# Patient Record
Sex: FEMALE | Race: WHITE | Employment: UNEMPLOYED | URBAN - METROPOLITAN AREA
[De-identification: names, ages, dates, MRNs, and addresses within clinical notes are randomized per-mention and may not be internally consistent; named-entity substitution may affect disease eponyms.]

---

## 2018-06-11 ENCOUNTER — HOSPITAL ENCOUNTER (OUTPATIENT)
Dept: OTHER | Age: 43
Discharge: HOME | End: 2018-06-11
Attending: INTERNAL MEDICINE | Admitting: INTERNAL MEDICINE

## 2018-06-11 LAB
ALBUMIN SERPL-MCNC: 4.2 GM/DL (ref 3.4–5)
ALP BLD-CCNC: 87 IU/L (ref 40–128)
ALT SERPL-CCNC: 32 U/L (ref 10–40)
ANION GAP SERPL CALCULATED.3IONS-SCNC: 12 MMOL/L (ref 4–16)
AST SERPL-CCNC: 29 IU/L (ref 15–37)
BANDED NEUTROPHILS ABSOLUTE COUNT: 0.3 K/CU MM
BANDED NEUTROPHILS RELATIVE PERCENT: 2 % (ref 5–11)
BILIRUB SERPL-MCNC: 0.3 MG/DL (ref 0–1)
BUN BLDV-MCNC: 10 MG/DL (ref 6–23)
CALCIUM SERPL-MCNC: 9.9 MG/DL (ref 8.3–10.6)
CHLORIDE BLD-SCNC: 99 MMOL/L (ref 99–110)
CO2: 30 MMOL/L (ref 21–32)
CREAT SERPL-MCNC: 0.7 MG/DL (ref 0.6–1.1)
DIFFERENTIAL TYPE: ABNORMAL
ERYTHROCYTE SEDIMENTATION RATE: 39 MM/HR (ref 0–20)
FERRITIN: 94 NG/ML (ref 15–150)
GFR AFRICAN AMERICAN: >60 ML/MIN/1.73M2
GFR NON-AFRICAN AMERICAN: >60 ML/MIN/1.73M2
GLUCOSE BLD-MCNC: 185 MG/DL (ref 70–99)
HCT VFR BLD CALC: 42.4 % (ref 37–47)
HEMOGLOBIN: 13.4 GM/DL (ref 12.5–16)
LACTATE DEHYDROGENASE: 190 IU/L (ref 120–246)
LYMPHOCYTES ABSOLUTE: 3.8 K/CU MM
LYMPHOCYTES RELATIVE PERCENT: 26 % (ref 24–44)
MCH RBC QN AUTO: 27.9 PG (ref 27–31)
MCHC RBC AUTO-ENTMCNC: 31.6 % (ref 32–36)
MCV RBC AUTO: 88.1 FL (ref 78–100)
MONOCYTES ABSOLUTE: 0.3 K/CU MM
MONOCYTES RELATIVE PERCENT: 2 % (ref 0–4)
PDW BLD-RTO: 13.7 % (ref 11.7–14.9)
PLATELET # BLD: 452 K/CU MM (ref 140–440)
PMV BLD AUTO: 10.1 FL (ref 7.5–11.1)
POTASSIUM SERPL-SCNC: 5.1 MMOL/L (ref 3.5–5.1)
RBC # BLD: 4.81 M/CU MM (ref 4.2–5.4)
SEGMENTED NEUTROPHILS ABSOLUTE COUNT: 10.4 K/CU MM
SEGMENTED NEUTROPHILS RELATIVE PERCENT: 70 % (ref 36–66)
SODIUM BLD-SCNC: 141 MMOL/L (ref 135–145)
TOTAL PROTEIN: 6.9 GM/DL (ref 6.4–8.2)
WBC # BLD: 14.8 K/CU MM (ref 4–10.5)

## 2018-06-13 LAB — ANTI-NUCLEAR ANTIBODY (ANA): NORMAL

## 2018-11-13 ENCOUNTER — HOSPITAL ENCOUNTER (OUTPATIENT)
Age: 43
Setting detail: SPECIMEN
Discharge: HOME | End: 2018-11-13

## 2018-11-13 LAB
ALBUMIN SERPL-MCNC: 4.1 GM/DL (ref 3.4–5)
ALP BLD-CCNC: 88 IU/L (ref 40–128)
ALT SERPL-CCNC: 44 U/L (ref 10–40)
ANION GAP SERPL CALCULATED.3IONS-SCNC: 11 MMOL/L (ref 4–16)
AST SERPL-CCNC: 50 IU/L (ref 15–37)
BILIRUB SERPL-MCNC: 0.5 MG/DL (ref 0–1)
BUN BLDV-MCNC: 9 MG/DL (ref 6–23)
CALCIUM SERPL-MCNC: 9.4 MG/DL (ref 8.3–10.6)
CHLORIDE BLD-SCNC: 99 MMOL/L (ref 99–110)
CO2: 28 MMOL/L (ref 21–32)
CREAT SERPL-MCNC: 0.6 MG/DL (ref 0.6–1.1)
GFR AFRICAN AMERICAN: >60 ML/MIN/1.73M2
GFR NON-AFRICAN AMERICAN: >60 ML/MIN/1.73M2
GLUCOSE BLD-MCNC: 183 MG/DL (ref 70–99)
POTASSIUM SERPL-SCNC: 4.8 MMOL/L (ref 3.5–5.1)
SODIUM BLD-SCNC: 138 MMOL/L (ref 135–145)
TOTAL PROTEIN: 6.8 GM/DL (ref 6.4–8.2)

## 2018-11-13 PROCEDURE — 80053 COMPREHEN METABOLIC PANEL: CPT

## 2019-07-15 ENCOUNTER — HOSPITAL ENCOUNTER (OUTPATIENT)
Age: 44
Setting detail: SPECIMEN
Discharge: HOME | End: 2019-07-15
Payer: COMMERCIAL

## 2019-07-15 LAB
ALBUMIN SERPL-MCNC: 4.2 GM/DL (ref 3.4–5)
ALP BLD-CCNC: 101 IU/L (ref 40–129)
ALT SERPL-CCNC: 48 U/L (ref 10–40)
ANION GAP SERPL CALCULATED.3IONS-SCNC: 11 MMOL/L (ref 4–16)
AST SERPL-CCNC: 52 IU/L (ref 15–37)
BILIRUB SERPL-MCNC: 0.3 MG/DL (ref 0–1)
BUN BLDV-MCNC: 8 MG/DL (ref 6–23)
CALCIUM SERPL-MCNC: 9.5 MG/DL (ref 8.3–10.6)
CHLORIDE BLD-SCNC: 98 MMOL/L (ref 99–110)
CO2: 27 MMOL/L (ref 21–32)
CREAT SERPL-MCNC: 0.6 MG/DL (ref 0.6–1.1)
GFR AFRICAN AMERICAN: >60 ML/MIN/1.73M2
GFR NON-AFRICAN AMERICAN: >60 ML/MIN/1.73M2
GLUCOSE BLD-MCNC: 255 MG/DL (ref 70–99)
LACTATE DEHYDROGENASE: 208 IU/L (ref 120–246)
POTASSIUM SERPL-SCNC: 5.2 MMOL/L (ref 3.5–5.1)
SODIUM BLD-SCNC: 136 MMOL/L (ref 135–145)
TOTAL PROTEIN: 6.9 GM/DL (ref 6.4–8.2)

## 2019-07-15 PROCEDURE — 80053 COMPREHEN METABOLIC PANEL: CPT

## 2019-07-15 PROCEDURE — 83615 LACTATE (LD) (LDH) ENZYME: CPT

## 2020-01-13 ENCOUNTER — HOSPITAL ENCOUNTER (OUTPATIENT)
Dept: INFUSION THERAPY | Age: 45
Discharge: HOME | End: 2020-01-13
Payer: COMMERCIAL

## 2020-01-13 LAB
ALBUMIN SERPL-MCNC: 4.4 GM/DL (ref 3.4–5)
ALP BLD-CCNC: 93 IU/L (ref 40–129)
ALT SERPL-CCNC: 35 U/L (ref 10–40)
ANION GAP SERPL CALCULATED.3IONS-SCNC: 12 MMOL/L (ref 4–16)
AST SERPL-CCNC: 37 IU/L (ref 15–37)
BASOPHILS ABSOLUTE: 0.4 K/CU MM
BASOPHILS RELATIVE PERCENT: 3 % (ref 0–1)
BILIRUB SERPL-MCNC: 0.4 MG/DL (ref 0–1)
BUN BLDV-MCNC: 8 MG/DL (ref 6–23)
CALCIUM SERPL-MCNC: 9.5 MG/DL (ref 8.3–10.6)
CHLORIDE BLD-SCNC: 96 MMOL/L (ref 99–110)
CO2: 27 MMOL/L (ref 21–32)
CREAT SERPL-MCNC: 0.7 MG/DL (ref 0.6–1.1)
DIFFERENTIAL TYPE: ABNORMAL
DIFFERENTIAL TYPE: ABNORMAL
EOSINOPHILS ABSOLUTE: 0.1 K/CU MM
EOSINOPHILS RELATIVE PERCENT: 1 % (ref 0–3)
GFR AFRICAN AMERICAN: >60 ML/MIN/1.73M2
GFR NON-AFRICAN AMERICAN: >60 ML/MIN/1.73M2
GLUCOSE BLD-MCNC: 212 MG/DL (ref 70–99)
HCT VFR BLD CALC: 41.3 % (ref 37–47)
HEMOGLOBIN: 13.6 GM/DL (ref 12.5–16)
LACTATE DEHYDROGENASE: 197 IU/L (ref 120–246)
LYMPHOCYTES ABSOLUTE: 3.2 K/CU MM
LYMPHOCYTES RELATIVE PERCENT: 23 % (ref 24–44)
MCH RBC QN AUTO: 27.9 PG (ref 27–31)
MCHC RBC AUTO-ENTMCNC: 32.9 % (ref 32–36)
MCV RBC AUTO: 84.8 FL (ref 78–100)
MONOCYTES ABSOLUTE: 0.6 K/CU MM
MONOCYTES RELATIVE PERCENT: 4 % (ref 0–4)
PDW BLD-RTO: 14 % (ref 11.7–14.9)
PLATELET # BLD: 424 K/CU MM (ref 140–440)
PMV BLD AUTO: 9.9 FL (ref 7.5–11.1)
POTASSIUM SERPL-SCNC: 4.9 MMOL/L (ref 3.5–5.1)
PROMYELOCYTES ABSOLUTE COUNT: 0.14 K/CU MM
PROMYELOCYTES PERCENT: 1 %
RBC # BLD: 4.87 M/CU MM (ref 4.2–5.4)
SEGMENTED NEUTROPHILS ABSOLUTE COUNT: 9.4 K/CU MM
SEGMENTED NEUTROPHILS RELATIVE PERCENT: 68 % (ref 36–66)
SODIUM BLD-SCNC: 135 MMOL/L (ref 135–145)
TOTAL PROTEIN: 7.3 GM/DL (ref 6.4–8.2)
WBC # BLD: 13.8 K/CU MM (ref 4–10.5)

## 2020-01-13 PROCEDURE — 85007 BL SMEAR W/DIFF WBC COUNT: CPT

## 2020-01-13 PROCEDURE — 85027 COMPLETE CBC AUTOMATED: CPT

## 2020-01-13 PROCEDURE — 36415 COLL VENOUS BLD VENIPUNCTURE: CPT

## 2020-01-13 PROCEDURE — 83615 LACTATE (LD) (LDH) ENZYME: CPT

## 2020-01-13 PROCEDURE — 80053 COMPREHEN METABOLIC PANEL: CPT

## 2020-08-14 PROBLEM — D75.839 THROMBOCYTOSIS: Status: ACTIVE | Noted: 2020-08-14

## 2020-08-14 PROBLEM — D72.828 NEUTROPHILIC LEUKOCYTOSIS: Status: ACTIVE | Noted: 2020-08-14

## 2020-08-14 PROBLEM — D72.9 NEUTROPHILIC LEUKOCYTOSIS: Status: ACTIVE | Noted: 2020-08-14

## 2020-09-15 PROBLEM — R53.82 CHRONIC FATIGUE, UNSPECIFIED: Status: ACTIVE | Noted: 2020-09-15

## 2021-12-24 ENCOUNTER — HOSPITAL ENCOUNTER (EMERGENCY)
Age: 46
Discharge: HOME | End: 2021-12-25
Attending: EMERGENCY MEDICINE
Payer: COMMERCIAL

## 2021-12-24 DIAGNOSIS — R05.9 COUGH: Primary | ICD-10-CM

## 2021-12-24 LAB
ALBUMIN SERPL-MCNC: 3.5 GM/DL (ref 3.4–5)
ALP BLD-CCNC: 96 IU/L (ref 40–129)
ALT SERPL-CCNC: 58 U/L (ref 10–40)
ANION GAP SERPL CALCULATED.3IONS-SCNC: 12 MMOL/L (ref 4–16)
AST SERPL-CCNC: 65 IU/L (ref 15–37)
BASOPHILS ABSOLUTE: 0 K/CU MM
BASOPHILS RELATIVE PERCENT: 0.4 % (ref 0–1)
BILIRUB SERPL-MCNC: 0.4 MG/DL (ref 0–1)
BUN BLDV-MCNC: 8 MG/DL (ref 6–23)
CALCIUM SERPL-MCNC: 8.6 MG/DL (ref 8.3–10.6)
CHLORIDE BLD-SCNC: 96 MMOL/L (ref 99–110)
CO2: 25 MMOL/L (ref 21–32)
CREAT SERPL-MCNC: 0.7 MG/DL (ref 0.6–1.1)
DIFFERENTIAL TYPE: ABNORMAL
EOSINOPHILS ABSOLUTE: 0 K/CU MM
EOSINOPHILS RELATIVE PERCENT: 0.2 % (ref 0–3)
GFR AFRICAN AMERICAN: >60 ML/MIN/1.73M2
GFR NON-AFRICAN AMERICAN: >60 ML/MIN/1.73M2
GLUCOSE BLD-MCNC: 253 MG/DL (ref 70–99)
HCT VFR BLD CALC: 44.7 % (ref 37–47)
HEMOGLOBIN: 14.7 GM/DL (ref 12.5–16)
IMMATURE NEUTROPHIL %: 1.2 % (ref 0–0.43)
LYMPHOCYTES ABSOLUTE: 1.6 K/CU MM
LYMPHOCYTES RELATIVE PERCENT: 31.1 % (ref 24–44)
MCH RBC QN AUTO: 27.1 PG (ref 27–31)
MCHC RBC AUTO-ENTMCNC: 32.9 % (ref 32–36)
MCV RBC AUTO: 82.5 FL (ref 78–100)
MONOCYTES ABSOLUTE: 0.4 K/CU MM
MONOCYTES RELATIVE PERCENT: 7 % (ref 0–4)
PDW BLD-RTO: 12.8 % (ref 11.7–14.9)
PLATELET # BLD: 297 K/CU MM (ref 140–440)
PMV BLD AUTO: 9.6 FL (ref 7.5–11.1)
POTASSIUM SERPL-SCNC: 4.3 MMOL/L (ref 3.5–5.1)
RAPID INFLUENZA  B AGN: NEGATIVE
RAPID INFLUENZA A AGN: NEGATIVE
RBC # BLD: 5.42 M/CU MM (ref 4.2–5.4)
SEGMENTED NEUTROPHILS ABSOLUTE COUNT: 3.1 K/CU MM
SEGMENTED NEUTROPHILS RELATIVE PERCENT: 60.1 % (ref 36–66)
SODIUM BLD-SCNC: 133 MMOL/L (ref 135–145)
TOTAL IMMATURE NEUTOROPHIL: 0.06 K/CU MM
TOTAL PROTEIN: 7.1 GM/DL (ref 6.4–8.2)
WBC # BLD: 5.1 K/CU MM (ref 4–10.5)

## 2021-12-24 PROCEDURE — 99283 EMERGENCY DEPT VISIT LOW MDM: CPT

## 2021-12-24 PROCEDURE — 96374 THER/PROPH/DIAG INJ IV PUSH: CPT

## 2021-12-24 PROCEDURE — 87804 INFLUENZA ASSAY W/OPTIC: CPT

## 2021-12-24 PROCEDURE — 2580000003 HC RX 258: Performed by: EMERGENCY MEDICINE

## 2021-12-24 PROCEDURE — U0003 INFECTIOUS AGENT DETECTION BY NUCLEIC ACID (DNA OR RNA); SEVERE ACUTE RESPIRATORY SYNDROME CORONAVIRUS 2 (SARS-COV-2) (CORONAVIRUS DISEASE [COVID-19]), AMPLIFIED PROBE TECHNIQUE, MAKING USE OF HIGH THROUGHPUT TECHNOLOGIES AS DESCRIBED BY CMS-2020-01-R: HCPCS

## 2021-12-24 PROCEDURE — 6370000000 HC RX 637 (ALT 250 FOR IP): Performed by: EMERGENCY MEDICINE

## 2021-12-24 PROCEDURE — 6360000002 HC RX W HCPCS: Performed by: EMERGENCY MEDICINE

## 2021-12-24 PROCEDURE — 80053 COMPREHEN METABOLIC PANEL: CPT

## 2021-12-24 PROCEDURE — U0005 INFEC AGEN DETEC AMPLI PROBE: HCPCS

## 2021-12-24 PROCEDURE — 85025 COMPLETE CBC W/AUTO DIFF WBC: CPT

## 2021-12-24 RX ORDER — LAMOTRIGINE 200 MG/1
200 TABLET ORAL 2 TIMES DAILY
COMMUNITY
Start: 2021-08-25

## 2021-12-24 RX ORDER — ACETAMINOPHEN 500 MG
1000 TABLET ORAL ONCE
Status: COMPLETED | OUTPATIENT
Start: 2021-12-24 | End: 2021-12-24

## 2021-12-24 RX ORDER — GLIPIZIDE 10 MG/1
10 TABLET ORAL 2 TIMES DAILY
COMMUNITY
End: 2022-09-22

## 2021-12-24 RX ORDER — LEVETIRACETAM 1000 MG/1
1000 TABLET ORAL 2 TIMES DAILY
COMMUNITY
Start: 2021-08-25

## 2021-12-24 RX ORDER — KETOROLAC TROMETHAMINE 30 MG/ML
15 INJECTION, SOLUTION INTRAMUSCULAR; INTRAVENOUS ONCE
Status: COMPLETED | OUTPATIENT
Start: 2021-12-24 | End: 2021-12-24

## 2021-12-24 RX ORDER — 0.9 % SODIUM CHLORIDE 0.9 %
1000 INTRAVENOUS SOLUTION INTRAVENOUS ONCE
Status: COMPLETED | OUTPATIENT
Start: 2021-12-24 | End: 2021-12-24

## 2021-12-24 RX ADMIN — ACETAMINOPHEN 1000 MG: 500 TABLET ORAL at 22:49

## 2021-12-24 RX ADMIN — KETOROLAC TROMETHAMINE 15 MG: 30 INJECTION, SOLUTION INTRAMUSCULAR; INTRAVENOUS at 23:37

## 2021-12-24 RX ADMIN — SODIUM CHLORIDE 1000 ML: 9 INJECTION, SOLUTION INTRAVENOUS at 22:49

## 2021-12-24 ASSESSMENT — ENCOUNTER SYMPTOMS
SORE THROAT: 0
RHINORRHEA: 0
SHORTNESS OF BREATH: 0
BACK PAIN: 0
EYE DISCHARGE: 0
COUGH: 1
EYE PAIN: 0
VOMITING: 0
ABDOMINAL PAIN: 0
NAUSEA: 0

## 2021-12-24 ASSESSMENT — PAIN SCALES - GENERAL
PAINLEVEL_OUTOF10: 0
PAINLEVEL_OUTOF10: 0

## 2021-12-25 ENCOUNTER — APPOINTMENT (OUTPATIENT)
Dept: GENERAL RADIOLOGY | Age: 46
End: 2021-12-25
Payer: COMMERCIAL

## 2021-12-25 VITALS
HEART RATE: 82 BPM | OXYGEN SATURATION: 94 % | RESPIRATION RATE: 20 BRPM | TEMPERATURE: 98.4 F | BODY MASS INDEX: 31.65 KG/M2 | HEIGHT: 65 IN | DIASTOLIC BLOOD PRESSURE: 69 MMHG | WEIGHT: 190 LBS | SYSTOLIC BLOOD PRESSURE: 105 MMHG

## 2021-12-25 LAB
SARS-COV-2: DETECTED
SOURCE: ABNORMAL

## 2021-12-25 PROCEDURE — 71045 X-RAY EXAM CHEST 1 VIEW: CPT

## 2021-12-25 RX ORDER — ONDANSETRON 4 MG/1
4 TABLET, ORALLY DISINTEGRATING ORAL 3 TIMES DAILY PRN
Qty: 21 TABLET | Refills: 0 | Status: SHIPPED | OUTPATIENT
Start: 2021-12-25

## 2021-12-25 NOTE — ED NOTES
Discharge instructions and prescription reviewed with pt and verbalizes understanding.      Krystal Carrizales RN  12/25/21 4790

## 2021-12-25 NOTE — ED PROVIDER NOTES
[address] [facility] ENCOUNTER      Pt Name: Chung Garcia  MRN: [id number]  [place] 1975  Date of evaluation: 12/24/2021  Provider: Jerad Matthews MD    CHIEF COMPLAINT       Chief Complaint   Patient presents with    Fatigue    Cough    Fever         HISTORY OF PRESENT ILLNESS      Chung Garcia is a 41 y.o. female who presents to the emergency department  for   Chief Complaint   Patient presents with    Fatigue    Cough    Fever       62-year-old female presents with several days of fatigue and poor oral intake. She developed a cough today. Denies any known sick contacts. She has not been vaccinated for Covid-19. She denies any vomiting or diarrhea. Denies any rectal abdominal pain. She has a family members who have tested positive for Covid-19 a few weeks ago. She denies any difficulty breathing. She denies any significant sputum production. She does have an elevated temperature upon presentation to the emergency department. She does not identify any chest pain alleviating factors. GCS of 15 emergency department. She is moving all extremity spontaneously. No signs of respiratory distress. Nursing Notes, Triage Notes & Vital Signs were reviewed. REVIEW OF SYSTEMS    (2-9 systems for level 4, 10 or more for level 5)     Review of Systems   Constitutional: Positive for fatigue and fever. HENT: Negative for congestion, rhinorrhea and sore throat. Eyes: Negative for pain and discharge. Respiratory: Positive for cough. Negative for shortness of breath. Gastrointestinal: Negative for abdominal pain, nausea and vomiting. Endocrine: Negative for polydipsia and polyuria. Genitourinary: Negative for dysuria and flank pain. Musculoskeletal: Negative for back pain and neck pain. Skin: Negative for pallor and wound. Neurological: Negative for dizziness, facial asymmetry, light-headedness, numbness and headaches. Psychiatric/Behavioral: Negative for confusion. Except as noted above the remainder of the review of systems was reviewed and negative. PAST MEDICAL HISTORY     Past Medical History:   Diagnosis Date    Diabetes mellitus ([facility]     Epilepsy ([facility]        Prior to Admission medications    Medication Sig Start Date End Date Taking? Authorizing Provider   ondansetron (ZOFRAN-ODT) 4 MG disintegrating tablet Take 1 tablet by mouth 3 times daily as needed for Nausea or Vomiting 21  Yes Arlyn Mae MD   lamoTRIgine (LAMICTAL) 200 MG tablet Take 200 mg by mouth 2 times daily 21  Yes Historical Provider, MD   levETIRAcetam (KEPPRA) 1000 MG tablet Take 1,000 mg by mouth 2 times daily 21  Yes Historical Provider, MD   metFORMIN (GLUCOPHAGE) 500 MG tablet Take 1,000 mg by mouth 2 times daily (with meals)   Yes Historical Provider, MD   glipiZIDE (GLUCOTROL) 10 MG tablet Take 10 mg by mouth 2 times daily   Yes Historical Provider, MD        Patient Active Problem List   Diagnosis    Neutrophilic leukocytosis    Thrombocytosis    Chronic fatigue, unspecified         SURGICAL HISTORY       Past Surgical History:   Procedure Laterality Date     SECTION      x3    CHOLECYSTECTOMY           CURRENT MEDICATIONS       Previous Medications    GLIPIZIDE (GLUCOTROL) 10 MG TABLET    Take 10 mg by mouth 2 times daily    LAMOTRIGINE (LAMICTAL) 200 MG TABLET    Take 200 mg by mouth 2 times daily    LEVETIRACETAM (KEPPRA) 1000 MG TABLET    Take 1,000 mg by mouth 2 times daily    METFORMIN (GLUCOPHAGE) 500 MG TABLET    Take 1,000 mg by mouth 2 times daily (with meals)       ALLERGIES     Patient has no known allergies. FAMILY HISTORY     History reviewed. No pertinent family history.        SOCIAL HISTORY       Social History     Socioeconomic History    Marital status:      Spouse name: None    Number of children: None    Years of education: None    Highest education level: None Occupational History    None   Tobacco Use    Smoking status: Never Smoker    Smokeless tobacco: Never Used   Substance and Sexual Activity    Alcohol use: Never    Drug use: Never    Sexual activity: None   Other Topics Concern    None   Social History Narrative    None     Social Determinants of Health     Financial Resource Strain:     Difficulty of Paying Living Expenses: Not on file   Food Insecurity:     Worried About Running Out of Food in the Last Year: Not on file    Joey of Food in the Last Year: Not on file   Transportation Needs:     Lack of Transportation (Medical): Not on file    Lack of Transportation (Non-Medical): Not on file   Physical Activity:     Days of Exercise per Week: Not on file    Minutes of Exercise per Session: Not on file   Stress:     Feeling of Stress : Not on file   Social Connections:     Frequency of Communication with Friends and Family: Not on file    Frequency of Social Gatherings with Friends and Family: Not on file    Attends Spiritism Services: Not on file    Active Member of [address] [place] [address] or Organizations: Not on file    Attends Club or Organization Meetings: Not on file    Marital Status: Not on file   Intimate Partner Violence:     Fear of Current or Ex-Partner: Not on file    Emotionally Abused: Not on file    Physically Abused: Not on file    Sexually Abused: Not on file   Housing Stability:     Unable to Pay for Housing in the Last Year: Not on file    Number of [place] in the Last Year: Not on file    Unstable Housing in the Last Year: Not on file       SCREENINGS               PHYSICAL EXAM    (up to 7 for level 4, 8 or more for level 5)     ED Triage Vitals [12/24/21 2146]   BP Temp Temp Source Pulse Resp SpO2 Height Weight   130/82 100.2 °F (37.9 °C) Oral 97 16 96 % 5' 5\" (1.651 m) 190 lb (86.2 kg)       Physical Exam  Vitals reviewed. Constitutional:       Appearance: She is not ill-appearing or toxic-appearing.    HENT:      Head: Normocephalic and atraumatic. Nose: No congestion or rhinorrhea. Mouth/Throat:      Mouth: Mucous membranes are moist.      Pharynx: No oropharyngeal exudate or posterior oropharyngeal erythema. Eyes:      General:         Right eye: No discharge. Left eye: No discharge. Extraocular Movements: Extraocular movements intact. Pupils: Pupils are equal, round, and reactive to light. Cardiovascular:      Rate and Rhythm: Normal rate. Heart sounds: No friction rub. No gallop. Pulmonary:      Effort: Pulmonary effort is normal. No respiratory distress. Chest:      Chest wall: No tenderness. Abdominal:      Palpations: Abdomen is soft. Tenderness: There is no abdominal tenderness. There is no guarding. Musculoskeletal:         General: No swelling or tenderness. Normal range of motion. Cervical back: Normal range of motion and neck supple. No tenderness. Lymphadenopathy:      Cervical: No cervical adenopathy. Skin:     General: Skin is warm. Capillary Refill: Capillary refill takes less than 2 seconds. Neurological:      General: No focal deficit present. Mental Status: She is alert and oriented to person, place, and time.          DIAGNOSTIC RESULTS     Labs Reviewed   COMPREHENSIVE METABOLIC PANEL W/ REFLEX TO MG FOR LOW K - Abnormal; Notable for the following components:       Result Value    Sodium 133 (*)     Chloride 96 (*)     Glucose 253 (*)     ALT 58 (*)     AST 65 (*)     All other components within normal limits   CBC WITH AUTO DIFFERENTIAL - Abnormal; Notable for the following components:    RBC 5.42 (*)     Monocytes % 7.0 (*)     Immature Neutrophil % 1.2 (*)     All other components within normal limits   RAPID INFLUENZA A/B ANTIGENS   COVID-19          RADIOLOGY:     Non-plain film images such as CT, Ultrasound and MRI are read by the radiologist. Plain radiographic images are visualized and preliminarily interpreted by the emergency physician. Interpretation per the Radiologist below, if available at the time of this note:    XR CHEST PORTABLE   Final Result   No acute cardiopulmonary process. ED BEDSIDE ULTRASOUND:   Performed by ED Physician Marquez Reese MD       LABS:  Labs Reviewed   COMPREHENSIVE METABOLIC PANEL W/ REFLEX TO MG FOR LOW K - Abnormal; Notable for the following components:       Result Value    Sodium 133 (*)     Chloride 96 (*)     Glucose 253 (*)     ALT 58 (*)     AST 65 (*)     All other components within normal limits   CBC WITH AUTO DIFFERENTIAL - Abnormal; Notable for the following components:    RBC 5.42 (*)     Monocytes % 7.0 (*)     Immature Neutrophil % 1.2 (*)     All other components within normal limits   RAPID INFLUENZA A/B ANTIGENS   COVID-19       All other labs were within normal range or not returned as of this dictation. EMERGENCY DEPARTMENT COURSE and DIFFERENTIAL DIAGNOSIS/MDM:   Vitals:    Vitals:    12/24/21 2146 12/25/21 0027   BP: 130/82 105/69   Pulse: 97 82   Resp: 16 20   Temp: 100.2 °F (37.9 °C) 98.4 °F (36.9 °C)   TempSrc: Oral Oral   SpO2: 96% 94%   Weight: 190 lb (86.2 kg)    Height: 5' 5\" (1.651 m)            MDM  Number of Diagnoses or Management Options  Diagnosis management comments: 61-year-old female presents with generalized fatigue and poor oral intake of the last 2 days. She presented to the emergency department with a temperature of 100.2. Vitals otherwise unremarkable. She denies any known sick contacts. She has not been vaccinated for Covid-19. She does endorse the development of a cough over the last day. No signs of respiratory distress. Labs are obtained and overall nonacute. She is treated with fluids, Toradol and Tylenol in the emergency department. Chest x-ray is obtained and is nonacute. Rapid influenza test is obtained and is negative. COVID-19 test is ordered and is pending at this time.   To follow results of her Afshin-19 test outpatient. With the constellation of her elevated temperatures, cough and generalized fatigue I do feel her symptoms are consistent with a viral infection. She will undertake symptomatic treatment at home. She will follow-up outpatient as needed. Strict return precautions for worsening or concerning symptoms are discussed. She is discharged in stable condition, ambulatory with return precautions. Amount and/or Complexity of Data Reviewed  Decide to obtain previous medical records or to obtain history from someone other than the patient: yes        -  Patient seen and evaluated in the emergency department. -  Triage and nursing notes reviewed and incorporated. -  Old chart records reviewed and incorporated. -  Work-up included:  See above  -  Results discussed with patient. CONSULTS:  None    PROCEDURES:  None performed unless otherwise noted below     Procedures        FINAL IMPRESSION      1. Cough          DISPOSITION/PLAN   DISPOSITION        PATIENT REFERRED TO:  [address]  [place] [address]  [phone]    Schedule an appointment as soon as possible for a visit in 1 week        DISCHARGE MEDICATIONS:  New Prescriptions    ONDANSETRON (ZOFRAN-ODT) 4 MG DISINTEGRATING TABLET    Take 1 tablet by mouth 3 times daily as needed for Nausea or Vomiting       ED Provider Disposition Time  DISPOSITION        Appropriate personal protective equipment was worn during the patient's evaluation. These included surgical, eye protection, surgical mask or in 95 respirator and gloves. The patient was also placed in a surgical mask for the prevention of possible spread of respiratory viral illnesses. The Patient was instructed to read the package inserts with any medication that was prescribed. Major potential reactions and medication interactions were discussed. The Patient understands that there are numerous possible adverse reactions not covered.     The patient was also instructed to arrange follow-up with his or her primary care provider for review of any pending labwork or incidental findings on any radiology results that were obtained. All efforts were made to discuss any incidental findings that require further monitoring. Controlled Substances Monitoring:     No flowsheet data found.     (Please note that portions of this note were completed with a voice recognition program.  Efforts were made to edit the dictations but occasionally words are mis-transcribed.)    Ghanshyam Morel MD (electronically signed)  Attending Emergency Physician           Ghanshyam Morel MD  12/25/21 0040

## 2022-07-11 ENCOUNTER — HOSPITAL ENCOUNTER (OUTPATIENT)
Age: 47
Discharge: HOME | End: 2022-07-11
Payer: COMMERCIAL

## 2022-07-11 LAB
ALBUMIN SERPL-MCNC: 3.9 GM/DL (ref 3.4–5)
ALP BLD-CCNC: 101 IU/L (ref 40–128)
ALT SERPL-CCNC: 32 U/L (ref 10–40)
ANION GAP SERPL CALCULATED.3IONS-SCNC: 10 MMOL/L (ref 4–16)
AST SERPL-CCNC: 33 IU/L (ref 15–37)
BASOPHILS ABSOLUTE: 0.1 K/CU MM
BASOPHILS RELATIVE PERCENT: 0.8 % (ref 0–1)
BILIRUB SERPL-MCNC: 0.4 MG/DL (ref 0–1)
BUN BLDV-MCNC: 10 MG/DL (ref 6–23)
CALCIUM SERPL-MCNC: 9.5 MG/DL (ref 8.3–10.6)
CHLORIDE BLD-SCNC: 99 MMOL/L (ref 99–110)
CO2: 27 MMOL/L (ref 21–32)
CREAT SERPL-MCNC: 0.8 MG/DL (ref 0.6–1.1)
DIFFERENTIAL TYPE: ABNORMAL
EOSINOPHILS ABSOLUTE: 0.4 K/CU MM
EOSINOPHILS RELATIVE PERCENT: 3.1 % (ref 0–3)
GFR AFRICAN AMERICAN: >60 ML/MIN/1.73M2
GFR NON-AFRICAN AMERICAN: >60 ML/MIN/1.73M2
GLUCOSE BLD-MCNC: 429 MG/DL (ref 70–99)
HCT VFR BLD CALC: 39.6 % (ref 37–47)
HEMOGLOBIN: 12.7 GM/DL (ref 12.5–16)
IMMATURE NEUTROPHIL %: 1.9 % (ref 0–0.43)
LYMPHOCYTES ABSOLUTE: 2.9 K/CU MM
LYMPHOCYTES RELATIVE PERCENT: 20.9 % (ref 24–44)
MCH RBC QN AUTO: 29 PG (ref 27–31)
MCHC RBC AUTO-ENTMCNC: 32.1 % (ref 32–36)
MCV RBC AUTO: 90.4 FL (ref 78–100)
MONOCYTES ABSOLUTE: 0.5 K/CU MM
MONOCYTES RELATIVE PERCENT: 3.8 % (ref 0–4)
NUCLEATED RBC %: 0 %
PDW BLD-RTO: 13.2 % (ref 11.7–14.9)
PLATELET # BLD: 406 K/CU MM (ref 140–440)
PMV BLD AUTO: 10.4 FL (ref 7.5–11.1)
POTASSIUM SERPL-SCNC: 4.7 MMOL/L (ref 3.5–5.1)
RBC # BLD: 4.38 M/CU MM (ref 4.2–5.4)
SEGMENTED NEUTROPHILS ABSOLUTE COUNT: 9.8 K/CU MM
SEGMENTED NEUTROPHILS RELATIVE PERCENT: 69.5 % (ref 36–66)
SODIUM BLD-SCNC: 136 MMOL/L (ref 135–145)
TOTAL IMMATURE NEUTOROPHIL: 0.27 K/CU MM
TOTAL NUCLEATED RBC: 0 K/CU MM
TOTAL PROTEIN: 6.8 GM/DL (ref 6.4–8.2)
WBC # BLD: 14.1 K/CU MM (ref 4–10.5)

## 2022-07-11 PROCEDURE — 80053 COMPREHEN METABOLIC PANEL: CPT

## 2022-07-11 PROCEDURE — 36415 COLL VENOUS BLD VENIPUNCTURE: CPT

## 2022-07-11 PROCEDURE — 85025 COMPLETE CBC W/AUTO DIFF WBC: CPT

## 2022-09-22 ENCOUNTER — OFFICE VISIT (OUTPATIENT)
Dept: ONCOLOGY | Age: 47
End: 2022-09-22
Payer: COMMERCIAL

## 2022-09-22 ENCOUNTER — HOSPITAL ENCOUNTER (OUTPATIENT)
Dept: INFUSION THERAPY | Age: 47
Discharge: HOME | End: 2022-09-22
Payer: COMMERCIAL

## 2022-09-22 VITALS
BODY MASS INDEX: 32.36 KG/M2 | HEIGHT: 65 IN | TEMPERATURE: 98 F | OXYGEN SATURATION: 97 % | DIASTOLIC BLOOD PRESSURE: 79 MMHG | HEART RATE: 88 BPM | WEIGHT: 194.2 LBS | SYSTOLIC BLOOD PRESSURE: 138 MMHG

## 2022-09-22 DIAGNOSIS — D75.839 THROMBOCYTOSIS: ICD-10-CM

## 2022-09-22 DIAGNOSIS — D72.9 NEUTROPHILIC LEUKOCYTOSIS: ICD-10-CM

## 2022-09-22 DIAGNOSIS — D72.9 NEUTROPHILIC LEUKOCYTOSIS: Primary | ICD-10-CM

## 2022-09-22 LAB
ALBUMIN SERPL-MCNC: 4.2 GM/DL (ref 3.4–5)
ALP BLD-CCNC: 94 IU/L (ref 40–129)
ALT SERPL-CCNC: 30 U/L (ref 10–40)
ANION GAP SERPL CALCULATED.3IONS-SCNC: 10 MMOL/L (ref 4–16)
AST SERPL-CCNC: 40 IU/L (ref 15–37)
BASOPHILS ABSOLUTE: 0.1 K/CU MM
BASOPHILS RELATIVE PERCENT: 0.3 % (ref 0–1)
BILIRUB SERPL-MCNC: 0.4 MG/DL (ref 0–1)
BUN BLDV-MCNC: 12 MG/DL (ref 6–23)
CALCIUM SERPL-MCNC: 9.4 MG/DL (ref 8.3–10.6)
CHLORIDE BLD-SCNC: 101 MMOL/L (ref 99–110)
CO2: 25 MMOL/L (ref 21–32)
CREAT SERPL-MCNC: 0.8 MG/DL (ref 0.6–1.1)
DIFFERENTIAL TYPE: ABNORMAL
EOSINOPHILS ABSOLUTE: 0.4 K/CU MM
EOSINOPHILS RELATIVE PERCENT: 2.7 % (ref 0–3)
ERYTHROCYTE SEDIMENTATION RATE: 14 MM/HR (ref 0–20)
FERRITIN: 154 NG/ML (ref 15–150)
GFR AFRICAN AMERICAN: >60 ML/MIN/1.73M2
GFR NON-AFRICAN AMERICAN: >60 ML/MIN/1.73M2
GLUCOSE BLD-MCNC: 189 MG/DL (ref 70–99)
HCT VFR BLD CALC: 39.8 % (ref 37–47)
HEMOGLOBIN: 12.9 GM/DL (ref 12.5–16)
LACTATE DEHYDROGENASE: 203 IU/L (ref 120–246)
LYMPHOCYTES ABSOLUTE: 3.2 K/CU MM
LYMPHOCYTES RELATIVE PERCENT: 20.6 % (ref 24–44)
MCH RBC QN AUTO: 28.5 PG (ref 27–31)
MCHC RBC AUTO-ENTMCNC: 32.4 % (ref 32–36)
MCV RBC AUTO: 87.9 FL (ref 78–100)
MONOCYTES ABSOLUTE: 0.8 K/CU MM
MONOCYTES RELATIVE PERCENT: 4.9 % (ref 0–4)
PDW BLD-RTO: 14.1 % (ref 11.7–14.9)
PLATELET # BLD: 413 K/CU MM (ref 140–440)
PMV BLD AUTO: 9.9 FL (ref 7.5–11.1)
POTASSIUM SERPL-SCNC: 4.6 MMOL/L (ref 3.5–5.1)
RBC # BLD: 4.53 M/CU MM (ref 4.2–5.4)
SEGMENTED NEUTROPHILS ABSOLUTE COUNT: 11 K/CU MM
SEGMENTED NEUTROPHILS RELATIVE PERCENT: 71.5 % (ref 36–66)
SODIUM BLD-SCNC: 136 MMOL/L (ref 135–145)
TOTAL PROTEIN: 6.9 GM/DL (ref 6.4–8.2)
WBC # BLD: 15.4 K/CU MM (ref 4–10.5)

## 2022-09-22 PROCEDURE — 85652 RBC SED RATE AUTOMATED: CPT

## 2022-09-22 PROCEDURE — 83615 LACTATE (LD) (LDH) ENZYME: CPT

## 2022-09-22 PROCEDURE — 99211 OFF/OP EST MAY X REQ PHY/QHP: CPT

## 2022-09-22 PROCEDURE — 80053 COMPREHEN METABOLIC PANEL: CPT

## 2022-09-22 PROCEDURE — 82728 ASSAY OF FERRITIN: CPT

## 2022-09-22 PROCEDURE — G8427 DOCREV CUR MEDS BY ELIG CLIN: HCPCS | Performed by: INTERNAL MEDICINE

## 2022-09-22 PROCEDURE — G8417 CALC BMI ABV UP PARAM F/U: HCPCS | Performed by: INTERNAL MEDICINE

## 2022-09-22 PROCEDURE — 1036F TOBACCO NON-USER: CPT | Performed by: INTERNAL MEDICINE

## 2022-09-22 PROCEDURE — 85025 COMPLETE CBC W/AUTO DIFF WBC: CPT

## 2022-09-22 PROCEDURE — 36415 COLL VENOUS BLD VENIPUNCTURE: CPT

## 2022-09-22 PROCEDURE — 99213 OFFICE O/P EST LOW 20 MIN: CPT | Performed by: INTERNAL MEDICINE

## 2022-09-22 RX ORDER — METFORMIN HYDROCHLORIDE 500 MG/1
TABLET, EXTENDED RELEASE ORAL
COMMUNITY
Start: 2022-09-01

## 2022-09-22 RX ORDER — LINAGLIPTIN 5 MG/1
TABLET, FILM COATED ORAL
COMMUNITY
Start: 2022-09-01

## 2022-09-22 RX ORDER — DULAGLUTIDE 0.75 MG/.5ML
INJECTION, SOLUTION SUBCUTANEOUS
COMMUNITY
Start: 2022-08-26

## 2022-09-22 RX ORDER — GLYBURIDE 5 MG/1
TABLET ORAL
COMMUNITY
Start: 2022-08-26

## 2022-09-22 ASSESSMENT — PATIENT HEALTH QUESTIONNAIRE - PHQ9
SUM OF ALL RESPONSES TO PHQ QUESTIONS 1-9: 0
SUM OF ALL RESPONSES TO PHQ QUESTIONS 1-9: 0
SUM OF ALL RESPONSES TO PHQ9 QUESTIONS 1 & 2: 0
1. LITTLE INTEREST OR PLEASURE IN DOING THINGS: 0
2. FEELING DOWN, DEPRESSED OR HOPELESS: 0
SUM OF ALL RESPONSES TO PHQ QUESTIONS 1-9: 0
SUM OF ALL RESPONSES TO PHQ QUESTIONS 1-9: 0

## 2022-09-22 NOTE — PROGRESS NOTES
Patient Name: Shahid Pineda  Patient : 1975  Patient MRN: [id number]     Primary Oncologist: Iman Crane MD  Referring Provider: PIYUSH Bernal CNP     Date of Service: 2022      Chief Complaint:   Chief Complaint   Patient presents with    Follow-up    Results     Patient Active Problem List:     Neutrophilic leukocytosis     Thrombocytosis     Chronic fatigue, unspecified    HPI:   Ms. Manpreet Barbour is a 50-year-old very pleasant patient with medical history significant for diabetes mellitus, diagnosed around , seizure disorder, anxiety and depression, initially referred to me on 2018 for evaluation of mild leukocytosis and thrombocytosis. She states that she was found to have leukocytosis by her neurologist on 2018. Repeat test by her primary care physician on May 21, 2018 showed persistent leukocytosis. She was also found to have mild thrombocytosis on blood test done on May 21, 2018. Because of her leukocytosis and thrombocytosis, she was subsequently referred to me for further evaluation. She denies frequent infection or recent infection. She never smokes cigarettes. She denies fever, chills, night sweats, loss of appetite, weight loss and any lymphadenopathy in her body. Laboratory workups on 2018 showed persistent neutrophil leukocytosis and thrombocytosis. She was also found to have my elevation in ESR [ESR 39]. The rest of the blood tests including complete comprehensive metabolic panel, iron study, LDH, QING, JAK2 and BCR/ABL 1 study where within normal range. She missed follow up with me since 20, until 22. On 2022, she was referred back to me for evaluation of her persistent leukocytosis. I have been following her for neutrophil leukocytosis and thrombocytosis. She is a non-smoker and she has normal iron study. JAK2 and BCR/ABL 1 study on 18 were within normal range.  She has seizure disorder and she is on keppra and lamictal.     I recognize that she has leukocytosis on 22 blood test. I will request all the work ups for MPN today. If all the work ups are normal, I will continue with observation. She does not have any complaints on today's visit. Past Medical History  Significant for  1. Diabetes mellitus  2. Anxiety/depression  3. Seizure disorder    Surgical History  Procedure: Cholecystocecostomy    section   Tonsillectomy   Dilatation and curettage: routine     Allergies  No known medication allergies    Social History  Smoking Status Never smoker  She denies alcohol drinking and illicit drug abuse. She is currently living in [place]. Family History  Father: Liver disease  Mother: Breast cancer, Diabetes, Hypertension    Oncology History    No history exists. Review of Systems: \"Per interval history; otherwise 10 point ROS is negative. \"  Her energy level is stable and her sleep is good. She denies fever, chills, night sweats, cough, shortness of breath, chest pain, hemoptysis, or palpitations. Her bowel and bladder functions are normal. Denies nausea, vomiting, abdominal pain, diarrhea, constipation, dysuria, loss of appetite, or weight loss. No neuropathy and does not have bleeding or clotting issues. She doesn't have any pain on today visit. No anxiety or depression. The rest of the systems are unremarkable.      Vital Signs:  /79 (Site: Right Upper Arm, Position: Sitting, Cuff Size: Medium Adult)   Pulse 88   Temp 98 °F (36.7 °C) (Temporal)   Ht 5' 5\" (1.651 m)   Wt 194 lb 3.2 oz (88.1 kg)   SpO2 97%   BMI 32.32 kg/m²     Physical Exam:  CONSTITUTIONAL: awake, alert, cooperative, no apparent distress   EYES: pupils equal, round and reactive to light, sclera clear, normal conjunctiva  ENT: Normocephalic, without obvious abnormality, atraumatic  NECK: supple, symmetrical, no jugular venous distension, no carotid bruits   HEMATOLOGIC/LYMPHATIC: no cervical, supraclavicular or axillary lymphadenopathy   LUNGS: VBS, no wheezes, no increased work of breathing, no rhonchi, clear to auscultation, no crackles,   CARDIOVASCULAR: regular rate and rhythm, normal S1 and S2, no murmur noted  ABDOMEN: normal bowel sounds x 4, soft, non-distended, non-tender, no masses palpated, no hepatosplenomegaly   MUSCULOSKELETAL: full range of motion noted, tone is normal  NEUROLOGIC: awake, alert, oriented to name, place and time. Motor skills grossly intact. SKIN: appears intact, normal skin color, normal texture, normal turgor, no jaundice.    EXTREMITIES: no LE edema, no clubbing, no leg swelling, no cyanosis,       Labs:  Hematology:  Lab Results   Component Value Date    WBC 15.4 (H) 09/22/2022    RBC 4.53 09/22/2022    HGB 12.9 09/22/2022    HCT 39.8 09/22/2022    MCV 87.9 09/22/2022    MCH 28.5 09/22/2022    MCHC 32.4 09/22/2022    RDW 14.1 09/22/2022     09/22/2022    MPV 9.9 09/22/2022    BANDSPCT 2 (L) 06/11/2018    SEGSPCT 71.5 (H) 09/22/2022    EOSRELPCT 2.7 09/22/2022    BASOPCT 0.3 09/22/2022    LYMPHOPCT 20.6 (L) 09/22/2022    MONOPCT 4.9 (H) 09/22/2022    BANDABS 0.30 06/11/2018    SEGSABS 11.0 09/22/2022    EOSABS 0.4 09/22/2022    BASOSABS 0.1 09/22/2022    LYMPHSABS 3.2 09/22/2022    MONOSABS 0.8 09/22/2022    DIFFTYPE AUTOMATED DIFFERENTIAL 09/22/2022     Lab Results   Component Value Date    ESR 14 09/22/2022     Chemistry:  Lab Results   Component Value Date     07/11/2022    K 4.7 07/11/2022    CL 99 07/11/2022    CO2 27 07/11/2022    BUN 10 07/11/2022    CREATININE 0.8 07/11/2022    GLUCOSE 429 (HH) 07/11/2022    CALCIUM 9.5 07/11/2022    PROT 6.8 07/11/2022    LABALBU 3.9 07/11/2022    BILITOT 0.4 07/11/2022    ALKPHOS 101 07/11/2022    AST 33 07/11/2022    ALT 32 07/11/2022    LABGLOM >60 07/11/2022    GFRAA >60 07/11/2022     Lab Results   Component Value Date     01/13/2020     No components found for: LD  No results found for: TSHHS, T4FREE, FT3  Immunology:  Lab Results   Component Value Date    PROT 6.8 07/11/2022     No results found for: Chelita Ba, KLFLCR  No results found for: B2M  Coagulation Panel:  No results found for: PROTIME, INR, APTT, DDIMER  Anemia Panel:  No results found for: AIWAEOZQ83, FOLATE  Tumor Markers:  No results found for: , CEA, , LABCA2, PSA  Observations:  PHQ-9 Total Score: 0 (9/22/2022  3:27 PM)      Assessment:  1. Leukocytosis  2. Thrombocytosis    Plan:   Ms. hill has been followed before for leukocytosis and thrombocytosis. She missed follow up with me since 1/13/20, until 9/22/22. On September 22, 2022, she was referred back to me for evaluation of her persistent leukocytosis. I have been following her for neutrophil leukocytosis and thrombocytosis. She is a non-smoker and she has normal iron study. JAK2 and BCR/ABL 1 study on 6/1/18 were within normal range. She has seizure disorder and she is on keppra and lamictal.     I recognize that she has leukocytosis on 7/11/22 blood test. I will request all the work ups for MPN today. If all the work ups are normal, I will continue with observation. I answered all their questions and concerns for today. Recent imaging and labs were reviewed and discussed with the patient.

## 2022-09-22 NOTE — PROGRESS NOTES
MA Rooming Questions  Patient: Irma Boyer  MRN: [id number]    Date: 9/22/2022        1. Do you have any new issues? yes - elevated wbc          2. Do you need any refills on medications?    no    3. Have you had any imaging done since your last visit? yes - labs at \[facility]"" and [facility]    4. Have you been hospitalized or seen in the emergency room since your last visit here?   no    5. Did the patient have a depression screening completed today?  Yes    PHQ-9 Total Score: 0 (9/22/2022  3:27 PM)       PHQ-9 Given to (if applicable):               PHQ-9 Score (if applicable):                     [] Positive     []  Negative              Does question #9 need addressed (if applicable)                     [] Yes    []  No               Judith Navas CMA

## 2022-10-11 NOTE — PROGRESS NOTES
Patient Name: Anat Frias  Patient : 1975  Patient MRN: [id number]     Primary Oncologist: Curry Daniel MD  Referring Provider: PIYUSH Suarez NP     Date of Service: 10/17/2022      Chief Complaint:   Chief Complaint   Patient presents with    Follow-up       Patient Active Problem List:     Neutrophilic leukocytosis     Thrombocytosis     Chronic fatigue, unspecified    HPI:   Ms. Estevan Moralez is a 81-year-old very pleasant patient with medical history significant for diabetes mellitus, diagnosed around , seizure disorder, anxiety and depression, initially referred to me on 2018 for evaluation of mild leukocytosis and thrombocytosis. She states that she was found to have leukocytosis by her neurologist on 2018. Repeat test by her primary care physician on May 21, 2018 showed persistent leukocytosis. She was also found to have mild thrombocytosis on blood test done on May 21, 2018. Because of her leukocytosis and thrombocytosis, she was subsequently referred to me for further evaluation. She denies frequent infection or recent infection. She never smokes cigarettes. She denies fever, chills, night sweats, loss of appetite, weight loss and any lymphadenopathy in her body. Laboratory workups on 2018 showed persistent neutrophil leukocytosis and thrombocytosis. She was also found to have my elevation in ESR [ESR 39]. The rest of the blood tests including complete comprehensive metabolic panel, iron study, LDH, QING, JAK2 and BCR/ABL 1 study where within normal range. She missed follow up with me since 20, until 22. Repeat laboratory work ups on 22 showed mild leukocytosis (WBC 15.4). The rests of the blood tests, including ferritin, ESR, BCR-ABL1, JAK2 V617F, JAK2 exon 12-13, MPL, CALR and flow cytometry, were within normal range. On 2022, she presented to me for follow up.      She was referred back to me on 22 for evaluation of her persistent leukocytosis. I have been following her for neutrophil leukocytosis and thrombocytosis. She is a non-smoker and she has normal iron study. JAK2 and BCR/ABL 1 study on 18 were within normal range. She has seizure disorder and she is on keppra and lamictal.     Since she has persistent leukocytosis, I requested MPN panels. It was done on 22 and all the work ups were within normal range. Since all the work ups for MPN are normal and she has stable counts, I recommend for observation. I will see her back in 1 year. She does not have any complaints on today's visit. Past Medical History  Significant for  1. Diabetes mellitus  2. Anxiety/depression  3. Seizure disorder    Surgical History  Procedure: Cholecystocecostomy    section   Tonsillectomy   Dilatation and curettage: routine     Allergies  No known medication allergies    Social History  Smoking Status Never smoker  She denies alcohol drinking and illicit drug abuse. She is currently living in [facility]. Family History  Father: Liver disease  Mother: Breast cancer, Diabetes, Hypertension    Oncology History    No history exists. Review of Systems: \"Per interval history; otherwise 10 point ROS is negative. \"  Her energy level is good and her sleep is fine. She denies fever, chills, night sweats, cough, shortness of breath, chest pain, hemoptysis, or palpitations. Her bowel and bladder functions are normal. Denies nausea, vomiting, abdominal pain, diarrhea, constipation, dysuria, loss of appetite, or weight loss. No neuropathy and does not have bleeding or clotting issues. She denies any pain on today visit. No anxiety or depression. The rest of the systems are unremarkable.      Vital Signs:  /67 (Site: Right Upper Arm, Position: Sitting, Cuff Size: Medium Adult)   Pulse 89   Temp 97.8 °F (36.6 °C) (Infrared)   Ht 5' 5\" (1.651 m)   Wt 195 lb 9.6 oz (88.7 kg)   SpO2 98%   BMI 32.55 kg/m²     Physical Exam:  CONSTITUTIONAL: awake, alert, cooperative, no apparent distress   EYES: pupils equal, round and reactive to light, sclera clear, normal conjunctiva  ENT: Normocephalic, without obvious abnormality, atraumatic  NECK: supple, symmetrical, no jugular venous distension, no carotid bruits   HEMATOLOGIC/LYMPHATIC: no cervical, supraclavicular or axillary lymphadenopathy   LUNGS: VBS, no wheezes, no increased work of breathing, no rhonchi, clear to auscultation, no crackles,   CARDIOVASCULAR: regular rate and rhythm, normal S1 and S2, no murmur noted  ABDOMEN: normal bowel sounds x 4, soft, non-distended, non-tender, no masses palpated, no hepatosplenomegaly   MUSCULOSKELETAL: full range of motion noted, tone is normal  NEUROLOGIC: awake, alert, oriented to name, place and time. Motor skills grossly intact. SKIN: appears intact, normal skin color, normal texture, normal turgor, no jaundice.    EXTREMITIES: no clubbing, no leg swelling, no LE edema, no cyanosis,       Labs:  Hematology:  Lab Results   Component Value Date    WBC 15.4 (H) 09/22/2022    RBC 4.53 09/22/2022    HGB 12.9 09/22/2022    HCT 39.8 09/22/2022    MCV 87.9 09/22/2022    MCH 28.5 09/22/2022    MCHC 32.4 09/22/2022    RDW 14.1 09/22/2022     09/22/2022    MPV 9.9 09/22/2022    BANDSPCT 2 (L) 06/11/2018    SEGSPCT 71.5 (H) 09/22/2022    EOSRELPCT 2.7 09/22/2022    BASOPCT 0.3 09/22/2022    LYMPHOPCT 20.6 (L) 09/22/2022    MONOPCT 4.9 (H) 09/22/2022    BANDABS 0.30 06/11/2018    SEGSABS 11.0 09/22/2022    EOSABS 0.4 09/22/2022    BASOSABS 0.1 09/22/2022    LYMPHSABS 3.2 09/22/2022    MONOSABS 0.8 09/22/2022    DIFFTYPE AUTOMATED DIFFERENTIAL 09/22/2022     Lab Results   Component Value Date    ESR 14 09/22/2022     Chemistry:  Lab Results   Component Value Date     09/22/2022    K 4.6 09/22/2022     09/22/2022    CO2 25 09/22/2022    BUN 12 09/22/2022    CREATININE 0.8 09/22/2022    GLUCOSE 189 (H) 09/22/2022    CALCIUM 9.4 09/22/2022    PROT 6.9 09/22/2022    LABALBU 4.2 09/22/2022    BILITOT 0.4 09/22/2022    ALKPHOS 94 09/22/2022    AST 40 (H) 09/22/2022    ALT 30 09/22/2022    LABGLOM >60 09/22/2022    GFRAA >60 09/22/2022     Lab Results   Component Value Date     09/22/2022     No components found for: LD  No results found for: TSHHS, T4FREE, FT3  Immunology:  Lab Results   Component Value Date    PROT 6.9 09/22/2022     No results found for: Judeen Batty, KLFLCR  No results found for: B2M  Coagulation Panel:  No results found for: PROTIME, INR, APTT, DDIMER  Anemia Panel:  No results found for: KKNCYAWO05, FOLATE  Tumor Markers:  No results found for: , CEA, , LABCA2, PSA  Observations:  No data recorded      Assessment:  1. Leukocytosis  2. Thrombocytosis    Plan:   Ms. hill has been followed before for leukocytosis and thrombocytosis. She missed follow up with me since 1/13/20, until 9/22/22. Repeat laboratory work ups on 9/22/22 showed mild leukocytosis (WBC 15.4). The rests of the blood tests, including ferritin, ESR, BCR-ABL1, JAK2 V617F, JAK2 exon 12-13, MPL, CALR and flow cytometry, were within normal range. On October 17, 2022, she presented to me for follow up. She was referred back to me on 9/22/22 for evaluation of her persistent leukocytosis. I have been following her for neutrophil leukocytosis and thrombocytosis. She is a non-smoker and she has normal iron study. JAK2 and BCR/ABL 1 study on 6/1/18 were within normal range. She has seizure disorder and she is on keppra and lamictal.     Since she has persistent leukocytosis, I requested MPN panels. It was done on 9/22/22 and all the work ups were within normal range. Since all the work ups for MPN are normal and she has stable counts, I recommend for observation. I will see her back in 1 year. I answered all their questions and concerns for today.      Recent imaging and labs were reviewed and discussed with the patient.

## 2022-10-14 LAB
BCR/ABL T(9,22): NORMAL
CALR MUTATION: NORMAL
COMMENT: NORMAL
JAK2 EXONS 12-15 MUTATION: NORMAL
JAK2 GENE MUTATION QUAL: NORMAL
MPL 515 MUTATION: NORMAL

## 2022-10-17 ENCOUNTER — HOSPITAL ENCOUNTER (OUTPATIENT)
Dept: INFUSION THERAPY | Age: 47
Discharge: HOME | End: 2022-10-17
Payer: COMMERCIAL

## 2022-10-17 ENCOUNTER — OFFICE VISIT (OUTPATIENT)
Dept: ONCOLOGY | Age: 47
End: 2022-10-17
Payer: COMMERCIAL

## 2022-10-17 VITALS
HEART RATE: 89 BPM | BODY MASS INDEX: 32.59 KG/M2 | SYSTOLIC BLOOD PRESSURE: 117 MMHG | OXYGEN SATURATION: 98 % | DIASTOLIC BLOOD PRESSURE: 67 MMHG | WEIGHT: 195.6 LBS | HEIGHT: 65 IN | TEMPERATURE: 97.8 F

## 2022-10-17 DIAGNOSIS — D72.9 NEUTROPHILIC LEUKOCYTOSIS: Primary | ICD-10-CM

## 2022-10-17 PROCEDURE — 99213 OFFICE O/P EST LOW 20 MIN: CPT | Performed by: INTERNAL MEDICINE

## 2022-10-17 PROCEDURE — 99211 OFF/OP EST MAY X REQ PHY/QHP: CPT

## 2022-10-17 PROCEDURE — G8417 CALC BMI ABV UP PARAM F/U: HCPCS | Performed by: INTERNAL MEDICINE

## 2022-10-17 PROCEDURE — 1036F TOBACCO NON-USER: CPT | Performed by: INTERNAL MEDICINE

## 2022-10-17 PROCEDURE — G8484 FLU IMMUNIZE NO ADMIN: HCPCS | Performed by: INTERNAL MEDICINE

## 2022-10-17 PROCEDURE — G8427 DOCREV CUR MEDS BY ELIG CLIN: HCPCS | Performed by: INTERNAL MEDICINE

## 2022-10-17 NOTE — PROGRESS NOTES
MA Rooming Questions  Patient: Pattie Cheema  MRN: [id number]    Date: 10/17/2022        1. Do you have any new issues?   no         2. Do you need any refills on medications?    no    3. Have you had any imaging done since your last visit?   no    4. Have you been hospitalized or seen in the emergency room since your last visit here?   no    5. Did the patient have a depression screening completed today?  No    No data recorded     PHQ-9 Given to (if applicable):               PHQ-9 Score (if applicable):                     [] Positive     []  Negative              Does question #9 need addressed (if applicable)                     [] Yes    []  No               Trent Braswell CMA

## 2023-10-15 NOTE — PROGRESS NOTES
Patient Name: Jia Yo  Patient : 1975  Patient MRN: [id number]     Primary Oncologist: Devika Jarvis MD  Referring Provider: PIYUSH Baer NP     Date of Service: 10/17/2023      Chief Complaint:   Chief Complaint   Patient presents with    Follow-up     Patient Active Problem List:     Neutrophilic leukocytosis     Thrombocytosis     Chronic fatigue, unspecified    HPI:   Ms. Radha Helm is a 79-year-old very pleasant patient with medical history significant for diabetes mellitus, diagnosed around , seizure disorder, anxiety and depression, initially referred to me on 2018 for evaluation of mild leukocytosis and thrombocytosis. She states that she was found to have leukocytosis by her neurologist on 2018. Repeat test by her primary care physician on May 21, 2018 showed persistent leukocytosis. She was also found to have mild thrombocytosis on blood test done on May 21, 2018. Because of her leukocytosis and thrombocytosis, she was subsequently referred to me for further evaluation. She denies frequent infection or recent infection. She never smokes cigarettes. She denies fever, chills, night sweats, loss of appetite, weight loss and any lymphadenopathy in her body. Laboratory workups on 2018 showed persistent neutrophil leukocytosis and thrombocytosis. She was also found to have my elevation in ESR [ESR 39]. The rest of the blood tests including complete comprehensive metabolic panel, iron study, LDH, QING, JAK2 and BCR/ABL 1 study where within normal range. She missed follow up with me since 20, until 22. Repeat laboratory work ups on 22 showed mild leukocytosis (WBC 15.4). The rests of the blood tests, including ferritin, ESR, BCR-ABL1, JAK2 V617F, JAK2 exon 12-13, MPL, CALR and flow cytometry, were within normal range. On 2023, she presented to me for follow up.      She was referred back to me on 22 for

## 2023-10-17 ENCOUNTER — OFFICE VISIT (OUTPATIENT)
Dept: ONCOLOGY | Age: 48
End: 2023-10-17
Payer: COMMERCIAL

## 2023-10-17 ENCOUNTER — HOSPITAL ENCOUNTER (OUTPATIENT)
Dept: INFUSION THERAPY | Age: 48
Discharge: HOME | End: 2023-10-17
Payer: COMMERCIAL

## 2023-10-17 VITALS
HEART RATE: 101 BPM | TEMPERATURE: 97 F | DIASTOLIC BLOOD PRESSURE: 63 MMHG | HEIGHT: 65 IN | BODY MASS INDEX: 32.19 KG/M2 | WEIGHT: 193.2 LBS | SYSTOLIC BLOOD PRESSURE: 121 MMHG | OXYGEN SATURATION: 98 %

## 2023-10-17 DIAGNOSIS — D75.839 THROMBOCYTOSIS: ICD-10-CM

## 2023-10-17 DIAGNOSIS — D72.9 NEUTROPHILIC LEUKOCYTOSIS: ICD-10-CM

## 2023-10-17 DIAGNOSIS — D72.9 NEUTROPHILIC LEUKOCYTOSIS: Primary | ICD-10-CM

## 2023-10-17 LAB
BASOPHILS ABSOLUTE: 0.1 K/CU MM
BASOPHILS RELATIVE PERCENT: 0.5 % (ref 0–1)
DIFFERENTIAL TYPE: ABNORMAL
EOSINOPHILS ABSOLUTE: 0.6 K/CU MM
EOSINOPHILS RELATIVE PERCENT: 4.7 % (ref 0–3)
ERYTHROCYTE SEDIMENTATION RATE: 29 MM/HR (ref 0–20)
FERRITIN: 85 NG/ML (ref 15–150)
HCT VFR BLD CALC: 40.3 % (ref 37–47)
HEMOGLOBIN: 12.7 GM/DL (ref 12.5–16)
IRON: 53 UG/DL (ref 37–145)
LACTATE DEHYDROGENASE: 178 IU/L (ref 120–246)
LYMPHOCYTES ABSOLUTE: 3.1 K/CU MM
LYMPHOCYTES RELATIVE PERCENT: 23.7 % (ref 24–44)
MCH RBC QN AUTO: 27.8 PG (ref 27–31)
MCHC RBC AUTO-ENTMCNC: 31.5 % (ref 32–36)
MCV RBC AUTO: 88.2 FL (ref 78–100)
MONOCYTES ABSOLUTE: 0.7 K/CU MM
MONOCYTES RELATIVE PERCENT: 5 % (ref 0–4)
PCT TRANSFERRIN: 18 % (ref 10–44)
PDW BLD-RTO: 14 % (ref 11.7–14.9)
PLATELET # BLD: 425 K/CU MM (ref 140–440)
PMV BLD AUTO: 9.7 FL (ref 7.5–11.1)
RBC # BLD: 4.57 M/CU MM (ref 4.2–5.4)
SEGMENTED NEUTROPHILS ABSOLUTE COUNT: 8.7 K/CU MM
SEGMENTED NEUTROPHILS RELATIVE PERCENT: 66.1 % (ref 36–66)
TOTAL IRON BINDING CAPACITY: 289 UG/DL (ref 250–450)
UNSATURATED IRON BINDING CAPACITY: 236 UG/DL (ref 110–370)
WBC # BLD: 13.1 K/CU MM (ref 4–10.5)

## 2023-10-17 PROCEDURE — G8417 CALC BMI ABV UP PARAM F/U: HCPCS | Performed by: INTERNAL MEDICINE

## 2023-10-17 PROCEDURE — 85652 RBC SED RATE AUTOMATED: CPT

## 2023-10-17 PROCEDURE — G8427 DOCREV CUR MEDS BY ELIG CLIN: HCPCS | Performed by: INTERNAL MEDICINE

## 2023-10-17 PROCEDURE — 83615 LACTATE (LD) (LDH) ENZYME: CPT

## 2023-10-17 PROCEDURE — 1036F TOBACCO NON-USER: CPT | Performed by: INTERNAL MEDICINE

## 2023-10-17 PROCEDURE — 85025 COMPLETE CBC W/AUTO DIFF WBC: CPT

## 2023-10-17 PROCEDURE — 99211 OFF/OP EST MAY X REQ PHY/QHP: CPT

## 2023-10-17 PROCEDURE — G8484 FLU IMMUNIZE NO ADMIN: HCPCS | Performed by: INTERNAL MEDICINE

## 2023-10-17 PROCEDURE — 83540 ASSAY OF IRON: CPT

## 2023-10-17 PROCEDURE — 36415 COLL VENOUS BLD VENIPUNCTURE: CPT

## 2023-10-17 PROCEDURE — 82728 ASSAY OF FERRITIN: CPT

## 2023-10-17 PROCEDURE — 83550 IRON BINDING TEST: CPT

## 2023-10-17 PROCEDURE — 99213 OFFICE O/P EST LOW 20 MIN: CPT | Performed by: INTERNAL MEDICINE

## 2023-10-17 ASSESSMENT — PATIENT HEALTH QUESTIONNAIRE - PHQ9
SUM OF ALL RESPONSES TO PHQ QUESTIONS 1-9: 18
9. THOUGHTS THAT YOU WOULD BE BETTER OFF DEAD, OR OF HURTING YOURSELF: 0
6. FEELING BAD ABOUT YOURSELF - OR THAT YOU ARE A FAILURE OR HAVE LET YOURSELF OR YOUR FAMILY DOWN: 3
SUM OF ALL RESPONSES TO PHQ9 QUESTIONS 1 & 2: 6
8. MOVING OR SPEAKING SO SLOWLY THAT OTHER PEOPLE COULD HAVE NOTICED. OR THE OPPOSITE, BEING SO FIGETY OR RESTLESS THAT YOU HAVE BEEN MOVING AROUND A LOT MORE THAN USUAL: 0
7. TROUBLE CONCENTRATING ON THINGS, SUCH AS READING THE NEWSPAPER OR WATCHING TELEVISION: 0
SUM OF ALL RESPONSES TO PHQ QUESTIONS 1-9: 18
1. LITTLE INTEREST OR PLEASURE IN DOING THINGS: 3
2. FEELING DOWN, DEPRESSED OR HOPELESS: 3
SUM OF ALL RESPONSES TO PHQ QUESTIONS 1-9: 18
5. POOR APPETITE OR OVEREATING: 3
3. TROUBLE FALLING OR STAYING ASLEEP: 3
10. IF YOU CHECKED OFF ANY PROBLEMS, HOW DIFFICULT HAVE THESE PROBLEMS MADE IT FOR YOU TO DO YOUR WORK, TAKE CARE OF THINGS AT HOME, OR GET ALONG WITH OTHER PEOPLE: 0
SUM OF ALL RESPONSES TO PHQ QUESTIONS 1-9: 18
4. FEELING TIRED OR HAVING LITTLE ENERGY: 3

## 2023-10-17 NOTE — PROGRESS NOTES
MA Rooming Questions  Patient: Carlos Manuel Velasquez  MRN: [id number]    Date: 10/17/2023        1. Do you have any new issues?   no         2. Do you need any refills on medications?    no    3. Have you had any imaging done since your last visit? yes - xray at [facility]    4. Have you been hospitalized or seen in the emergency room since your last visit here?   yes - N ER    5. Did the patient have a depression screening completed today?  Yes    PHQ-9 Total Score: 18 (10/17/2023  9:08 AM)  Thoughts that you would be better off dead, or of hurting yourself in some way: 0 (10/17/2023  9:08 AM)       PHQ-9 Given to (if applicable):               PHQ-9 Score (if applicable):                     [x] Positive     []  Negative              Does question #9 need addressed (if applicable)                     [] Yes    [x]  No               Mary Rudd CMA

## 2024-10-13 NOTE — PROGRESS NOTES
Patient Name: Carolina Cabral  Patient : 1975  Patient MRN: [id number]     Primary Oncologist: Mick Read MD  Referring Provider: Kaylee Jensen APRN - NP     Date of Service: 10/17/2024      Chief Complaint:   Chief Complaint   Patient presents with    Follow-up     Patient Active Problem List:     Neutrophilic leukocytosis     Thrombocytosis     Chronic fatigue, unspecified    HPI:   Ms. Cabral is a 48-year-old very pleasant patient with medical history significant for diabetes mellitus, diagnosed around , seizure disorder, anxiety and depression, initially referred to me on 2018 for evaluation of mild leukocytosis and thrombocytosis.    She states that she was found to have leukocytosis by her neurologist on 2018.  Repeat test by her primary care physician on May 21, 2018 showed persistent leukocytosis.  She was also found to have mild thrombocytosis on blood test done on May 21, 2018.    Because of her leukocytosis and thrombocytosis, she was subsequently referred to me for further evaluation.    She denies frequent infection or recent infection. She never smokes cigarettes. She denies fever, chills, night sweats, loss of appetite, weight loss and any lymphadenopathy in her body.     Laboratory workups on 2018 showed persistent neutrophil leukocytosis and thrombocytosis.  She was also found to have my elevation in ESR [ESR 39]. The rest of the blood tests including complete comprehensive metabolic panel, iron study, LDH, QING, JAK2 and BCR/ABL 1 study where within normal range.    She missed follow up with me since 20, until 22.     Repeat laboratory work ups on 22 showed mild leukocytosis (WBC 15.4). The rests of the blood tests, including ferritin, ESR, BCR-ABL1, JAK2 V617F, JAK2 exon 12-13, MPL, CALR and flow cytometry, were within normal range.     On 2024, she presented to me for follow up.     She was referred back to me on 22 for

## 2024-10-17 ENCOUNTER — OFFICE VISIT (OUTPATIENT)
Dept: ONCOLOGY | Age: 49
End: 2024-10-17
Payer: COMMERCIAL

## 2024-10-17 ENCOUNTER — HOSPITAL ENCOUNTER (OUTPATIENT)
Dept: INFUSION THERAPY | Age: 49
Discharge: HOME | End: 2024-10-17
Payer: COMMERCIAL

## 2024-10-17 VITALS
SYSTOLIC BLOOD PRESSURE: 131 MMHG | WEIGHT: 181.8 LBS | TEMPERATURE: 97.5 F | HEART RATE: 71 BPM | BODY MASS INDEX: 30.29 KG/M2 | OXYGEN SATURATION: 100 % | DIASTOLIC BLOOD PRESSURE: 75 MMHG | HEIGHT: 65 IN

## 2024-10-17 DIAGNOSIS — D75.839 THROMBOCYTOSIS: ICD-10-CM

## 2024-10-17 DIAGNOSIS — D72.828 NEUTROPHILIC LEUKOCYTOSIS: Primary | ICD-10-CM

## 2024-10-17 DIAGNOSIS — D72.828 NEUTROPHILIC LEUKOCYTOSIS: ICD-10-CM

## 2024-10-17 LAB
ALBUMIN SERPL-MCNC: 4 G/DL (ref 3.4–5)
ALBUMIN/GLOB SERPL: 1.5 {RATIO} (ref 1.1–2.2)
ALP SERPL-CCNC: 101 U/L (ref 40–129)
ALT SERPL-CCNC: 14 U/L (ref 10–40)
ANION GAP SERPL CALCULATED.3IONS-SCNC: 10 MMOL/L (ref 9–17)
AST SERPL-CCNC: 19 U/L (ref 15–37)
BASOPHILS # BLD: 0.07 K/UL
BASOPHILS NFR BLD: 1 % (ref 0–1)
BILIRUB SERPL-MCNC: 0.2 MG/DL (ref 0–1)
BUN SERPL-MCNC: 15 MG/DL (ref 7–20)
CALCIUM SERPL-MCNC: 9 MG/DL (ref 8.3–10.6)
CHLORIDE SERPL-SCNC: 103 MMOL/L (ref 99–110)
CO2 SERPL-SCNC: 24 MMOL/L (ref 21–32)
CREAT SERPL-MCNC: 1 MG/DL (ref 0.6–1.1)
EOSINOPHIL # BLD: 0.57 K/UL
EOSINOPHILS RELATIVE PERCENT: 4 % (ref 0–3)
ERYTHROCYTE [DISTWIDTH] IN BLOOD BY AUTOMATED COUNT: 14.3 % (ref 11.7–14.9)
ERYTHROCYTE [SEDIMENTATION RATE] IN BLOOD BY WESTERGREN METHOD: 14 MM/HR (ref 0–20)
GFR, ESTIMATED: 63 ML/MIN/1.73M2
GLUCOSE SERPL-MCNC: 120 MG/DL (ref 74–99)
HCT VFR BLD AUTO: 40 % (ref 37–47)
HGB BLD-MCNC: 12.9 G/DL (ref 12.5–16)
LDH SERPL-CCNC: 199 U/L (ref 100–190)
LYMPHOCYTES NFR BLD: 2.98 K/UL
LYMPHOCYTES RELATIVE PERCENT: 23 % (ref 24–44)
MCH RBC QN AUTO: 28.1 PG (ref 27–31)
MCHC RBC AUTO-ENTMCNC: 32.3 G/DL (ref 32–36)
MCV RBC AUTO: 87.1 FL (ref 78–100)
MONOCYTES NFR BLD: 0.61 K/UL
MONOCYTES NFR BLD: 5 % (ref 0–4)
NEUTROPHILS NFR BLD: 67 % (ref 36–66)
NEUTS SEG NFR BLD: 8.62 K/UL
PLATELET # BLD AUTO: 402 K/UL (ref 140–440)
PMV BLD AUTO: 9.5 FL (ref 7.5–11.1)
POTASSIUM SERPL-SCNC: 4.9 MMOL/L (ref 3.5–5.1)
PROT SERPL-MCNC: 6.7 G/DL (ref 6.4–8.2)
RBC # BLD AUTO: 4.59 M/UL (ref 4.2–5.4)
SODIUM SERPL-SCNC: 136 MMOL/L (ref 136–145)
TSH SERPL DL<=0.05 MIU/L-ACNC: 1.72 UIU/ML (ref 0.27–4.2)
WBC OTHER # BLD: 12.9 K/UL (ref 4–10.5)

## 2024-10-17 PROCEDURE — 80053 COMPREHEN METABOLIC PANEL: CPT

## 2024-10-17 PROCEDURE — 83615 LACTATE (LD) (LDH) ENZYME: CPT

## 2024-10-17 PROCEDURE — G8427 DOCREV CUR MEDS BY ELIG CLIN: HCPCS | Performed by: INTERNAL MEDICINE

## 2024-10-17 PROCEDURE — 99213 OFFICE O/P EST LOW 20 MIN: CPT | Performed by: INTERNAL MEDICINE

## 2024-10-17 PROCEDURE — 85025 COMPLETE CBC W/AUTO DIFF WBC: CPT

## 2024-10-17 PROCEDURE — 36415 COLL VENOUS BLD VENIPUNCTURE: CPT

## 2024-10-17 PROCEDURE — 85652 RBC SED RATE AUTOMATED: CPT

## 2024-10-17 PROCEDURE — G8417 CALC BMI ABV UP PARAM F/U: HCPCS | Performed by: INTERNAL MEDICINE

## 2024-10-17 PROCEDURE — 1036F TOBACCO NON-USER: CPT | Performed by: INTERNAL MEDICINE

## 2024-10-17 PROCEDURE — G8484 FLU IMMUNIZE NO ADMIN: HCPCS | Performed by: INTERNAL MEDICINE

## 2024-10-17 PROCEDURE — 84443 ASSAY THYROID STIM HORMONE: CPT

## 2024-10-17 PROCEDURE — 99211 OFF/OP EST MAY X REQ PHY/QHP: CPT

## 2024-10-17 RX ORDER — SEMAGLUTIDE 2.68 MG/ML
INJECTION, SOLUTION SUBCUTANEOUS
COMMUNITY

## 2024-10-17 ASSESSMENT — PATIENT HEALTH QUESTIONNAIRE - PHQ9
SUM OF ALL RESPONSES TO PHQ9 QUESTIONS 1 & 2: 0
1. LITTLE INTEREST OR PLEASURE IN DOING THINGS: NOT AT ALL
2. FEELING DOWN, DEPRESSED OR HOPELESS: NOT AT ALL
SUM OF ALL RESPONSES TO PHQ QUESTIONS 1-9: 0

## 2024-10-17 NOTE — PROGRESS NOTES
MA Rooming Questions  Patient: Carolina Cabral  MRN: [id number]    Date: 10/17/2024        1. Do you have any new issues?   no         2. Do you need any refills on medications?    no    3. Have you had any imaging done since your last visit?   no    4. Have you been hospitalized or seen in the emergency room since your last visit here?   no    5. Did the patient have a depression screening completed today? Yes    PHQ-9 Total Score: 0 (10/17/2024 10:20 AM)       PHQ-9 Given to (if applicable):               PHQ-9 Score (if applicable):                     [] Positive     []  Negative              Does question #9 need addressed (if applicable)                     [] Yes    []  No               Viridiana Gee MA